# Patient Record
Sex: MALE | Race: BLACK OR AFRICAN AMERICAN | NOT HISPANIC OR LATINO | Employment: OTHER | URBAN - METROPOLITAN AREA
[De-identification: names, ages, dates, MRNs, and addresses within clinical notes are randomized per-mention and may not be internally consistent; named-entity substitution may affect disease eponyms.]

---

## 2023-08-31 PROBLEM — E03.9 HYPOTHYROIDISM: Status: ACTIVE | Noted: 2023-08-31

## 2023-08-31 PROBLEM — C09.9: Status: ACTIVE | Noted: 2023-08-31

## 2023-08-31 PROBLEM — K11.7 XEROSTOMIA: Status: ACTIVE | Noted: 2023-08-31

## 2023-08-31 PROBLEM — R13.12 OROPHARYNGEAL DYSPHAGIA: Status: ACTIVE | Noted: 2023-08-31

## 2023-08-31 PROBLEM — E11.9 DIABETES MELLITUS (MULTI): Status: ACTIVE | Noted: 2023-08-31

## 2023-08-31 PROBLEM — T66.XXXA ADVERSE EFFECT OF RADIATION THERAPY: Status: ACTIVE | Noted: 2023-08-31

## 2023-08-31 PROBLEM — C77.9: Status: ACTIVE | Noted: 2023-08-31

## 2023-08-31 RX ORDER — ASPIRIN 325 MG
TABLET ORAL
COMMUNITY

## 2023-08-31 RX ORDER — LANCETS 26 GAUGE
1 EACH MISCELLANEOUS AS NEEDED
COMMUNITY

## 2023-08-31 RX ORDER — ASCORBIC ACID 250 MG
TABLET ORAL
COMMUNITY

## 2023-08-31 RX ORDER — HUMAN INSULIN 100 [IU]/ML
INJECTION, SUSPENSION SUBCUTANEOUS
COMMUNITY
Start: 2021-04-18

## 2023-08-31 RX ORDER — METFORMIN HYDROCHLORIDE 500 MG/1
TABLET ORAL
COMMUNITY
Start: 2019-02-18

## 2023-08-31 RX ORDER — ZINC SULFATE 50(220)MG
CAPSULE ORAL
COMMUNITY

## 2023-08-31 RX ORDER — BLOOD SUGAR DIAGNOSTIC
STRIP MISCELLANEOUS
COMMUNITY
Start: 2021-04-18

## 2023-08-31 RX ORDER — GLIMEPIRIDE 4 MG/1
TABLET ORAL
COMMUNITY
End: 2024-04-08 | Stop reason: ALTCHOICE

## 2023-08-31 RX ORDER — LANCETS
EACH MISCELLANEOUS
COMMUNITY

## 2023-08-31 RX ORDER — DIAZEPAM 5 MG/1
TABLET ORAL
COMMUNITY
Start: 2021-05-26 | End: 2024-04-08 | Stop reason: ALTCHOICE

## 2023-08-31 RX ORDER — LISINOPRIL 5 MG/1
TABLET ORAL
COMMUNITY

## 2023-08-31 RX ORDER — PEN NEEDLE, DIABETIC 30 GX3/16"
NEEDLE, DISPOSABLE MISCELLANEOUS
COMMUNITY

## 2023-08-31 RX ORDER — SIMVASTATIN 10 MG/1
TABLET, FILM COATED ORAL
COMMUNITY
Start: 2020-12-14

## 2023-08-31 RX ORDER — GLIMEPIRIDE 2 MG/1
TABLET ORAL
COMMUNITY
Start: 2021-04-18 | End: 2024-04-08 | Stop reason: ALTCHOICE

## 2023-10-09 ENCOUNTER — OFFICE VISIT (OUTPATIENT)
Dept: OTOLARYNGOLOGY | Facility: CLINIC | Age: 58
End: 2023-10-09
Payer: COMMERCIAL

## 2023-10-09 VITALS — WEIGHT: 240 LBS | HEIGHT: 73 IN | BODY MASS INDEX: 31.81 KG/M2

## 2023-10-09 DIAGNOSIS — Z79.4 TYPE 2 DIABETES MELLITUS WITHOUT COMPLICATION, WITH LONG-TERM CURRENT USE OF INSULIN (MULTI): Primary | ICD-10-CM

## 2023-10-09 DIAGNOSIS — C09.9 MALIGNANT NEOPLASM OF TONSIL, UNSPECIFIED (MULTI): ICD-10-CM

## 2023-10-09 DIAGNOSIS — R13.12 OROPHARYNGEAL DYSPHAGIA: ICD-10-CM

## 2023-10-09 DIAGNOSIS — K11.7 XEROSTOMIA: ICD-10-CM

## 2023-10-09 DIAGNOSIS — E11.9 TYPE 2 DIABETES MELLITUS WITHOUT COMPLICATION, WITH LONG-TERM CURRENT USE OF INSULIN (MULTI): Primary | ICD-10-CM

## 2023-10-09 DIAGNOSIS — T66.XXXD ADVERSE EFFECT OF RADIATION THERAPY, SUBSEQUENT ENCOUNTER: ICD-10-CM

## 2023-10-09 PROBLEM — C77.9: Status: RESOLVED | Noted: 2023-08-31 | Resolved: 2023-10-09

## 2023-10-09 PROCEDURE — 4010F ACE/ARB THERAPY RXD/TAKEN: CPT | Performed by: OTOLARYNGOLOGY

## 2023-10-09 PROCEDURE — 99214 OFFICE O/P EST MOD 30 MIN: CPT | Performed by: OTOLARYNGOLOGY

## 2023-10-09 PROCEDURE — 1036F TOBACCO NON-USER: CPT | Performed by: OTOLARYNGOLOGY

## 2023-10-09 PROCEDURE — 92511 NASOPHARYNGOSCOPY: CPT | Performed by: OTOLARYNGOLOGY

## 2023-10-09 ASSESSMENT — ENCOUNTER SYMPTOMS
NAUSEA: 0
COUGH: 0
FEVER: 0
SORE THROAT: 0
TROUBLE SWALLOWING: 0
FACIAL SWELLING: 0
CHILLS: 0
VOMITING: 0
ADENOPATHY: 0
NECK PAIN: 0
FATIGUE: 0
STRIDOR: 0
UNEXPECTED WEIGHT CHANGE: 0
SHORTNESS OF BREATH: 0
VOICE CHANGE: 0
APPETITE CHANGE: 0

## 2023-10-09 NOTE — PATIENT INSTRUCTIONS
Dr. West evaluated you today.    Your care plan is outlined below:  -- Follow up with Dr. West in 6 mo.  This appointment was scheduled at the end of your visit today.  If you need to reschedule, please call the office at [phone].  Please keep in mind that last minute cancellations often result in delayed follow-up appointments.       General appointment line please call [phone]  For general questions or scheduling issues please call [phone] option #2   For medical questions or surgery scheduling please call [phone] on Mondays, Wednesdays and Thursdays or [phone] on Tuesdays and Fridays. Please be sure to leave a voice mail or your call will not be able to be returned.     Dr. West makes every effort to run on time for your appointments.  Therefore, if you are more than 30 minutes late for your appointment, unrelated to a scan or another appointment such as chemotherapy or radiation, your appointment will need to be rescheduled to another day.  We appreciate your understanding.

## 2023-10-09 NOTE — PROGRESS NOTES
Cancer follow up  TSH: Due 4/24  Lab Results   Component Value Date    TSH 1.36 03/29/2021   Chest: Due 4/24    HPI:  Leon Nieves a 58 y.o.old male   Chief Complaint   Patient presents with    6 month FUV   Last seen 4/23.  He is here for follow up of his left oropharyngeal cancer now 4 1/2 years out.  He is doing well.  He had recent blood work.  His Creatinine was elevated 1.36 but GFR is still normal.  His A1C is also elevated 7.4.  TSH is normal.  He has had some issues with his right calf but it is getting better.  He is trying to maintain his weight despite not being able to run for exercise.  He has switched his PCP and is happier/feeling more connected.    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 Final pathology 4cm primary tumor with negative margins and 5 of 22 lymph nodes with +metastasis with +ECS  5/13/19: Started chemoradiation therapy  6/24/19: Completed chemoradiation therapy  9/10/19: CT neck w/contrast personally reviewed with good response, no persistent masses or lymphadenopathy  10/1/19: 3 month post treatment PET. SUV (2.6) at the left tonsil SUV (1.8) at the left neck, no distant metastasis  5/14/20: TSH 1.16   10/5/20: R parotid swelling, mouth irritation  10/12/20: Found to have irritated alveolar mucosa around his partially erupted right 3rd molar (tooth #32)  10/26/20: +right submandibular gland swelling, no pain  12/3/20: CT neck w/ contrast for right facial swelling. Negative for masses in the head or neck. Prominent right parotid and submandibular gland   3/21: CXR and TSH normal  3/22: CXR and TSH normal  4/23: TSH normal    ROS:  Review of Systems   Constitutional:  Negative for appetite change, chills, fatigue, fever and unexpected weight change.   HENT:  Negative for dental problem,  drooling, ear pain, facial swelling, hearing loss, mouth sores, sore throat, tinnitus, trouble swallowing and voice change.    Respiratory:  Negative for cough, shortness of breath and stridor.    Gastrointestinal:  Negative for nausea and vomiting.   Musculoskeletal:  Negative for neck pain.        +right calf discomfort   Hematological:  Negative for adenopathy.        PE:  ENT Physical Exam  Constitutional  Appearance: patient appears well-developed and well-groomed, patient is cooperative;  Communication/Voice: vocal quality normal;  Head and Face  Appearance: head appears normal and face appears normal;  Salivary: Salivary comments: + right parotid gland hypertrophied (stable)  Ear  Hearing: intact;  Auricles: right auricle normal; left auricle normal;  Ear Canals: right ear canal normal; left ear canal normal;  Tympanic Membranes: right tympanic membrane normal; left tympanic membrane normal;  Nose  External Nose: nares patent bilaterally; external nose normal;  Internal Nose: nasal mucosa normal; septum normal; bilateral inferior turbinates normal;  Oral Cavity/Oropharynx  Lips: normal;  Teeth: no trismus noted;  Gums: gingiva normal;  Tongue: normal;  Oral mucosa: normal;  Hard palate: normal;  Soft palate: normal;  Tonsils: normal;  Base of Tongue: normal;  OC/OP comments: Uvula deviated from previous oropharyngeal resection - stable.  Oropharynx soft, no masses or lesions.  Neck  Neck: scars and radiation changes present; no lesions present; no neck tenderness present; normal trachea;  Neck comments: No lymphadenopathy, mild fibrosis  Respiratory  Inspection: breathing unlabored;  Cardiovascular  Inspection: extremities are warm and well perfused;  Neurovestibular  Mental Status: alert and oriented;  Psychiatric: mood normal; affect is appropriate;  Cranial Nerves: cranial nerves intact;       Procedures   PROCEDURE NOTE:  Recommended flexible nasopharyngoscopy & laryngoscopy.  Risks, benefits, personnel  and alternatives were explained.  The patient wished to proceed.  S/he was re-identified.  PROCEDURE:  Flexible nasopharyngoscopy and laryngoscopy  PREOPERATIVE DIAGNOSIS: oropharyngeal cancer surveillance  POSTOPERATIVE DIAGNOSIS: Same as above, no evidence of recurrence   INDICATIONS: Inability to tolerate mirror exam  ANESTHESIA: 4% lidocaine and 0.5% phenylephrine  PROCEDURE:  With the patient sitting upright topical anesthesia and vasoconstriction was applied with spray to the right side(s) of the nose.  After waiting an appropriate period of time for anesthesia/vasoconstriction to become effective, a flexible laryngoscope was passed through the right side(s) of the nose.  Nasopharynx, oropharynx, hypopharynx and larynx were examined.  FINDINGS:  The nasopharynx and oropharynx were normal without any lesions or masses visualized.  No masses or lesions were visualized at the base of tongue, vallecula, epiglottis, aryepiglottic folds, pyriform sinuses, and lateral pharyngeal walls.  True vocal fold movement was normal.  Mild radiation edema - stable. The patient's airway was widely patent with no evidence of obstruction.  Patient tolerated the procedure well, and there were no complications.    ASSESSMENT AND PLAN:  Problem List Items Addressed This Visit       Adverse effect of radiation therapy    Current Assessment & Plan     - muscle spasms at times         Type 2 diabetes mellitus without complication, with long-term current use of insulin (CMS/[facility]) - Primary    Current Assessment & Plan     Hemoglobin A1C slightly elevated, he will follow up with his PCP         Malignant neoplasm of tonsil, unspecified (CMS/HCC)    Current Assessment & Plan     - 4 years out. No evidence of disease. Follow up in 6 mo           Oropharyngeal dysphagia    Current Assessment & Plan     - stable  - Continue home swallowing strategies         Xerostomia    Current Assessment & Plan     - adverse effects of radiation. chronic,  stable.  - Continue conservative tx with humdification        Yessica West MD    Head & Neck Surgical Oncology & Reconstruction  Department of Otolaryngology - Head and Neck Surgery         By signing my name below, I, Alisha Pageibflaquita, attest that this documentation has been prepared under the direction and in the presence of Dr. Yessica West MD.     All medical record entries made by the Scribe were at my direction and personally dictated by me, Dr. Yessica West. I have reviewed the chart and agree that the record accurately reflects my personal performance of the history, physical exam, discussion and plan.

## 2023-10-09 NOTE — LETTER
October 9, 2023     Juliann Rubin PA-C  [address]  [facility] [address]    Patient: Leon Nick   YOB: 1965   Date of Visit: 10/9/2023       Dear Dr. Juliann Rubin PA-C:    Thank you for referring Leon Nick to me for evaluation. Below are my notes for this consultation.  If you have questions, please do not hesitate to call me. I look forward to following your patient along with you.       Sincerely,     Yessica West MD      CC: No Recipients  ______________________________________________________________________________________    Cancer follow up  TSH: Due 4/24  Lab Results   Component Value Date    TSH 1.36 03/29/2021   Chest: Due 4/24    HPI:  Leon Nickis a 58 y.o.old male   Chief Complaint   Patient presents with   • 6 month FUV   Last seen 4/23.  He is here for follow up of his left oropharyngeal cancer now 4 1/2 years out.  He is doing well.  He had recent blood work.  His Creatinine was elevated 1.36 but GFR is still normal.  His A1C is also elevated 7.4.  TSH is normal.  He has had some issues with his right calf but it is getting better.  He is trying to maintain his weight despite not being able to run for exercise.  He has switched his PCP and is happier/feeling more connected.    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 Final pathology 4cm primary tumor with negative margins and 5 of 22 lymph nodes with +metastasis with +ECS  5/13/19: Started chemoradiation therapy  6/24/19: Completed chemoradiation therapy  9/10/19: CT neck w/contrast personally reviewed with good response, no persistent masses or lymphadenopathy  10/1/19: 3 month post treatment PET. SUV (2.6) at the left tonsil SUV (1.8) at the left neck, no distant metastasis  5/14/20: TSH 1.16    10/5/20: R parotid swelling, mouth irritation  10/12/20: Found to have irritated alveolar mucosa around his partially erupted right 3rd molar (tooth #32)  10/26/20: +right submandibular gland swelling, no pain  12/3/20: CT neck w/ contrast for right facial swelling. Negative for masses in the head or neck. Prominent right parotid and submandibular gland   3/21: CXR and TSH normal  3/22: CXR and TSH normal  4/23: TSH normal    ROS:  Review of Systems   Constitutional:  Negative for appetite change, chills, fatigue, fever and unexpected weight change.   HENT:  Negative for dental problem, drooling, ear pain, facial swelling, hearing loss, mouth sores, sore throat, tinnitus, trouble swallowing and voice change.    Respiratory:  Negative for cough, shortness of breath and stridor.    Gastrointestinal:  Negative for nausea and vomiting.   Musculoskeletal:  Negative for neck pain.        +right calf discomfort   Hematological:  Negative for adenopathy.        PE:  ENT Physical Exam  Constitutional  Appearance: patient appears well-developed and well-groomed, patient is cooperative;  Communication/Voice: vocal quality normal;  Head and Face  Appearance: head appears normal and face appears normal;  Salivary: Salivary comments: + right parotid gland hypertrophied (stable)  Ear  Hearing: intact;  Auricles: right auricle normal; left auricle normal;  Ear Canals: right ear canal normal; left ear canal normal;  Tympanic Membranes: right tympanic membrane normal; left tympanic membrane normal;  Nose  External Nose: nares patent bilaterally; external nose normal;  Internal Nose: nasal mucosa normal; septum normal; bilateral inferior turbinates normal;  Oral Cavity/Oropharynx  Lips: normal;  Teeth: no trismus noted;  Gums: gingiva normal;  Tongue: normal;  Oral mucosa: normal;  Hard palate: normal;  Soft palate: normal;  Tonsils: normal;  Base of Tongue: normal;  OC/OP comments: Uvula deviated from previous oropharyngeal  resection - stable.  Oropharynx soft, no masses or lesions.  Neck  Neck: scars and radiation changes present; no lesions present; no neck tenderness present; normal trachea;  Neck comments: No lymphadenopathy, mild fibrosis  Respiratory  Inspection: breathing unlabored;  Cardiovascular  Inspection: extremities are warm and well perfused;  Neurovestibular  Mental Status: alert and oriented;  Psychiatric: mood normal; affect is appropriate;  Cranial Nerves: cranial nerves intact;       Procedures   PROCEDURE NOTE:  Recommended flexible nasopharyngoscopy & laryngoscopy.  Risks, benefits, personnel and alternatives were explained.  The patient wished to proceed.  S/he was re-identified.  PROCEDURE:  Flexible nasopharyngoscopy and laryngoscopy  PREOPERATIVE DIAGNOSIS: oropharyngeal cancer surveillance  POSTOPERATIVE DIAGNOSIS: Same as above, no evidence of recurrence   INDICATIONS: Inability to tolerate mirror exam  ANESTHESIA: 4% lidocaine and 0.5% phenylephrine  PROCEDURE:  With the patient sitting upright topical anesthesia and vasoconstriction was applied with spray to the right side(s) of the nose.  After waiting an appropriate period of time for anesthesia/vasoconstriction to become effective, a flexible laryngoscope was passed through the right side(s) of the nose.  Nasopharynx, oropharynx, hypopharynx and larynx were examined.  FINDINGS:  The nasopharynx and oropharynx were normal without any lesions or masses visualized.  No masses or lesions were visualized at the base of tongue, vallecula, epiglottis, aryepiglottic folds, pyriform sinuses, and lateral pharyngeal walls.  True vocal fold movement was normal.  Mild radiation edema - stable. The patient's airway was widely patent with no evidence of obstruction.  Patient tolerated the procedure well, and there were no complications.    ASSESSMENT AND PLAN:  Problem List Items Addressed This Visit       Adverse effect of radiation therapy    Current Assessment &  Plan     - muscle spasms at times         Type 2 diabetes mellitus without complication, with long-term current use of insulin (CMS/HCC) - Primary    Current Assessment & Plan     Hemoglobin A1C slightly elevated, he will follow up with his PCP         Malignant neoplasm of tonsil, unspecified (CMS/HCC)    Current Assessment & Plan     - 4 years out. No evidence of disease. Follow up in 6 mo           Oropharyngeal dysphagia    Current Assessment & Plan     - stable  - Continue home swallowing strategies         Xerostomia    Current Assessment & Plan     - adverse effects of radiation. chronic, stable.  - Continue conservative tx with humdification        Yessica West MD    Head & Neck Surgical Oncology & Reconstruction  Department of Otolaryngology - Head and Neck Surgery         By signing my name below, I, Alisha Pageibe, attest that this documentation has been prepared under the direction and in the presence of Dr. Yessiac West MD.     All medical record entries made by the Scribe were at my direction and personally dictated by me, Dr. Yessica West. I have reviewed the chart and agree that the record accurately reflects my personal performance of the history, physical exam, discussion and plan.

## 2024-03-26 ASSESSMENT — ENCOUNTER SYMPTOMS
NECK PAIN: 0
NAUSEA: 0
VOICE CHANGE: 0
CHILLS: 0
STRIDOR: 0
COUGH: 0
SHORTNESS OF BREATH: 0
ADENOPATHY: 0
VOMITING: 0
SORE THROAT: 0
FEVER: 0
FACIAL SWELLING: 0
TROUBLE SWALLOWING: 0
APPETITE CHANGE: 0
UNEXPECTED WEIGHT CHANGE: 0
FATIGUE: 0

## 2024-03-26 NOTE — PROGRESS NOTES
Cancer follow up  TSH: Due 4/24  Chest: Due 4/24    HPI:  Leon Nick is a 58 y.o. male here for follow up. Last seen 10/23.  He is here for follow up of his left oropharyngeal cancer now 5 years out!  He is doing well.  He had recent blood work.  His Creatinine was elevated 1.45.  His A1C is also still elevated at 7.1.  He has had some issues with his right calf but it is getting better.  He is trying to maintain his weight despite not being able to run for exercise.  He has switched his PCP and is happier/feeling more connected.    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 Final pathology 4cm primary tumor with negative margins and 5 of 22 lymph nodes with +metastasis with +ECS  5/13/19: Started chemoradiation therapy  6/24/19: Completed chemoradiation therapy  9/10/19: CT neck w/contrast personally reviewed with good response, no persistent masses or lymphadenopathy  10/1/19: 3 month post treatment PET. SUV (2.6) at the left tonsil SUV (1.8) at the left neck, no distant metastasis  5/14/20: TSH 1.16   10/5/20: R parotid swelling, mouth irritation  10/12/20: Found to have irritated alveolar mucosa around his partially erupted right 3rd molar (tooth #32)  10/26/20: +right submandibular gland swelling, no pain  12/3/20: CT neck w/ contrast for right facial swelling. Negative for masses in the head or neck. Prominent right parotid and submandibular gland   3/21: CXR and TSH normal  3/22: CXR and TSH normal  4/23: TSH normal    ROS:  Review of Systems   Constitutional:  Negative for appetite change, chills, fatigue, fever and unexpected weight change.   HENT:  Negative for dental problem, drooling, ear pain, facial swelling, hearing loss, mouth sores, sore throat, tinnitus, trouble swallowing and voice change.     Respiratory:  Negative for cough, shortness of breath and stridor.    Gastrointestinal:  Negative for nausea and vomiting.   Musculoskeletal:  Negative for neck pain.        +right calf discomfort   Hematological:  Negative for adenopathy.        PE:  ENT Physical Exam  Constitutional  Appearance: patient appears well-developed and well-groomed, patient is cooperative;  Communication/Voice: vocal quality normal;  Head and Face  Appearance: head appears normal and face appears normal;  Salivary: Salivary comments: + right parotid gland hypertrophied (stable)  Ear  Hearing: intact;  Auricles: right auricle normal; left auricle normal;  Ear Canals: right ear canal normal; left ear canal normal;  Tympanic Membranes: right tympanic membrane normal; left tympanic membrane normal;  Nose  External Nose: nares patent bilaterally; external nose normal;  Internal Nose: nasal mucosa normal; septum normal;  Oral Cavity/Oropharynx  Lips: normal;  Teeth: no trismus noted;  Gums: gingiva normal;  Tongue: normal;  Oral mucosa: normal;  OC/OP comments: Uvula deviated from previous oropharyngeal resection - stable.  Oropharynx soft, no masses or lesions.  Neck  Neck: scars and radiation changes present; no lesions present; no neck tenderness present; normal trachea;  Neck comments: No lymphadenopathy, mild fibrosis  Respiratory  Inspection: breathing unlabored;  Cardiovascular  Inspection: extremities are warm and well perfused;  Neurovestibular  Mental Status: alert and oriented;  Psychiatric: mood normal; affect is appropriate;  Cranial Nerves: cranial nerves intact;       Procedures   PROCEDURE NOTE:  Recommended flexible nasopharyngoscopy & laryngoscopy.  Risks, benefits, personnel and alternatives were explained.  The patient wished to proceed.  S/he was re-identified.  PROCEDURE:  Flexible nasopharyngoscopy and laryngoscopy  PREOPERATIVE DIAGNOSIS: oropharyngeal cancer surveillance  POSTOPERATIVE DIAGNOSIS: Same as above, no  evidence of recurrence  INDICATIONS: Inability to tolerate mirror exam  ANESTHESIA: 4% lidocaine and 0.5% phenylephrine  PROCEDURE:  With the patient sitting upright topical anesthesia and vasoconstriction was applied with spray to the right side(s) of the nose.  After waiting an appropriate period of time for anesthesia/vasoconstriction to become effective, a flexible laryngoscope was passed through the right side(s) of the nose.  Nasopharynx, oropharynx, hypopharynx and larynx were examined.  FINDINGS:  The nasopharynx and oropharynx were normal without any lesions or masses visualized.  No masses or lesions were visualized at the base of tongue, vallecula, epiglottis, aryepiglottic folds, pyriform sinuses, and lateral pharyngeal walls.  True vocal fold movement was normal.  Mild radiation edema - stable. The patient's airway was widely patent with no evidence of obstruction.  Patient tolerated the procedure well, and there were no complications.    ASSESSMENT AND PLAN:  Problem List Items Addressed This Visit       Adverse effect of radiation therapy    Hypothyroidism    Current Assessment & Plan     TSH due now, ordered, will call/message with results         Malignant neoplasm of tonsil, unspecified (Multi)    Current Assessment & Plan     No evidence of disease on exam or endoscopy  Follow up in 1 year         Oropharyngeal dysphagia    Current Assessment & Plan     Chronic, adverse effect of radiation, mild  Continue current diet, weight stable         Xerostomia    Current Assessment & Plan     Chronic, adverse effect of radiation, stable  Continue conservative tx         Lymphedema    Current Assessment & Plan     Mild, adverse effect of radiation, chronic  Continue massage & conservative tx         Parotid swelling - Primary    Current Assessment & Plan     Right parotid hypertrophy, chronic, stable          Yessica West MD    Head & Neck Surgical Oncology & Reconstruction  Department of  Otolaryngology - Head and Neck Surgery     By signing my name below, I, Edmund Page, attest that this documentation has been prepared under the direction and in the presence of Dr. Yessica West MD.     All medical record entries made by the Scribe were at my direction and personally dictated by me, Dr. Yessica West. I have reviewed the chart and agree that the record accurately reflects my personal performance of the history, physical exam, discussion and plan.

## 2024-04-08 ENCOUNTER — OFFICE VISIT (OUTPATIENT)
Dept: OTOLARYNGOLOGY | Facility: CLINIC | Age: 59
End: 2024-04-08
Payer: COMMERCIAL

## 2024-04-08 VITALS — WEIGHT: 231.4 LBS | HEIGHT: 73 IN | BODY MASS INDEX: 30.67 KG/M2

## 2024-04-08 DIAGNOSIS — I89.0 LYMPHEDEMA: ICD-10-CM

## 2024-04-08 DIAGNOSIS — R60.9 PAROTID SWELLING: Primary | ICD-10-CM

## 2024-04-08 DIAGNOSIS — T66.XXXD ADVERSE EFFECT OF RADIATION THERAPY, SUBSEQUENT ENCOUNTER: ICD-10-CM

## 2024-04-08 DIAGNOSIS — C09.9 MALIGNANT NEOPLASM OF TONSIL, UNSPECIFIED (MULTI): ICD-10-CM

## 2024-04-08 DIAGNOSIS — K11.7 XEROSTOMIA: ICD-10-CM

## 2024-04-08 DIAGNOSIS — R13.12 OROPHARYNGEAL DYSPHAGIA: ICD-10-CM

## 2024-04-08 DIAGNOSIS — E03.9 ACQUIRED HYPOTHYROIDISM: ICD-10-CM

## 2024-04-08 PROBLEM — R55 SYNCOPE: Status: ACTIVE | Noted: 2021-04-14

## 2024-04-08 PROBLEM — Z85.818 HISTORY OF CANCER TONSIL: Status: ACTIVE | Noted: 2021-04-14

## 2024-04-08 PROBLEM — E86.0 DEHYDRATION: Status: ACTIVE | Noted: 2019-06-14

## 2024-04-08 PROBLEM — R09.02 HYPOXIA: Status: ACTIVE | Noted: 2021-04-14

## 2024-04-08 PROBLEM — R06.89 ACUTE RESPIRATORY INSUFFICIENCY: Status: ACTIVE | Noted: 2021-04-14

## 2024-04-08 PROBLEM — T38.0X5A STEROID-INDUCED HYPERGLYCEMIA: Status: ACTIVE | Noted: 2021-04-14

## 2024-04-08 PROBLEM — R73.9 STEROID-INDUCED HYPERGLYCEMIA: Status: ACTIVE | Noted: 2021-04-14

## 2024-04-08 PROBLEM — R11.0 NAUSEA: Status: ACTIVE | Noted: 2019-06-14

## 2024-04-08 PROBLEM — U07.1 COVID-19: Status: ACTIVE | Noted: 2021-04-13

## 2024-04-08 PROBLEM — E78.2 MIXED HYPERLIPIDEMIA: Status: ACTIVE | Noted: 2023-09-01

## 2024-04-08 PROCEDURE — 99214 OFFICE O/P EST MOD 30 MIN: CPT | Performed by: OTOLARYNGOLOGY

## 2024-04-08 PROCEDURE — 92511 NASOPHARYNGOSCOPY: CPT | Performed by: OTOLARYNGOLOGY

## 2024-04-08 PROCEDURE — 4010F ACE/ARB THERAPY RXD/TAKEN: CPT | Performed by: OTOLARYNGOLOGY

## 2024-04-08 RX ORDER — DULAGLUTIDE 0.75 MG/.5ML
0.75 INJECTION, SOLUTION SUBCUTANEOUS
COMMUNITY

## 2024-04-08 RX ORDER — AMOXICILLIN 250 MG
2 CAPSULE ORAL
COMMUNITY
Start: 2024-01-15

## 2024-04-08 RX ORDER — MINERAL OIL
180 ENEMA (ML) RECTAL
COMMUNITY
Start: 2022-04-22

## 2024-04-08 RX ORDER — AZELASTINE 1 MG/ML
2 SPRAY, METERED NASAL 2 TIMES DAILY
COMMUNITY
Start: 2023-07-13

## 2024-04-08 RX ORDER — FLUTICASONE PROPIONATE 50 MCG
1 SPRAY, SUSPENSION (ML) NASAL 2 TIMES DAILY
COMMUNITY
Start: 2023-07-13

## 2024-04-08 RX ORDER — TAMSULOSIN HYDROCHLORIDE 0.4 MG/1
0.4 CAPSULE ORAL DAILY
COMMUNITY

## 2024-04-21 PROBLEM — U07.1 COVID-19: Status: RESOLVED | Noted: 2021-04-13 | Resolved: 2024-04-21

## 2024-04-21 PROBLEM — R09.02 HYPOXIA: Status: RESOLVED | Noted: 2021-04-14 | Resolved: 2024-04-21

## 2024-04-21 PROBLEM — R06.89 ACUTE RESPIRATORY INSUFFICIENCY: Status: RESOLVED | Noted: 2021-04-14 | Resolved: 2024-04-21

## 2025-04-04 ASSESSMENT — ENCOUNTER SYMPTOMS
NAUSEA: 0
UNEXPECTED WEIGHT CHANGE: 0
ADENOPATHY: 0
FATIGUE: 0
SHORTNESS OF BREATH: 0
STRIDOR: 0
VOICE CHANGE: 0
NECK PAIN: 0
FACIAL SWELLING: 0
SORE THROAT: 0
APPETITE CHANGE: 0
COUGH: 0
TROUBLE SWALLOWING: 0
VOMITING: 0
FEVER: 0
CHILLS: 0

## 2025-04-04 NOTE — PROGRESS NOTES
Cancer follow up  TSH: Due now  Chest: Due now    HPI:  Leon Nick is a 59 y.o. male here for follow up. Last seen 4/2024.  He is here for follow up of his left oropharyngeal cancer now 6 years out.  He continues to do well.  His right parotid remains the same without any episodes of enlargement.  He has occasional neck soreness but otherwise is doing well.  Denies odynophagia, dysphagia or otalgia. Tolerating a regular diet.  Denies weight loss.  No fevers/chills.  No night sweats.  His dad is currently battling cancer.      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 Final pathology 4cm primary tumor with negative margins and 5 of 22 lymph nodes with +metastasis with +ECS  5/13/19: Started chemoradiation therapy  6/24/19: Completed chemoradiation therapy  9/10/19: CT neck w/contrast personally reviewed with good response, no persistent masses or lymphadenopathy  10/1/19: 3 month post treatment PET. SUV (2.6) at the left tonsil SUV (1.8) at the left neck, no distant metastasis  5/14/20: TSH 1.16   10/5/20: R parotid swelling, mouth irritation  10/12/20: Found to have irritated alveolar mucosa around his partially erupted right 3rd molar (tooth #32)  10/26/20: +right submandibular gland swelling, no pain  12/3/20: CT neck w/ contrast for right facial swelling. Negative for masses in the head or neck. Prominent right parotid and submandibular gland   3/21: CXR and TSH normal  3/22: CXR and TSH normal  4/23: TSH normal  8/24: TSH normal  3/25: TSH normal    ROS:  Review of Systems   Constitutional:  Negative for appetite change, chills, fatigue, fever and unexpected weight change.   HENT:  Negative for dental problem, drooling, ear pain, facial swelling, hearing loss, mouth sores, sore throat, tinnitus, trouble swallowing  and voice change.    Respiratory:  Negative for cough, shortness of breath and stridor.    Gastrointestinal:  Negative for nausea and vomiting.   Musculoskeletal:  Negative for neck pain.   Hematological:  Negative for adenopathy.        PE:  ENT Physical Exam  Constitutional  Appearance: patient appears well-developed and well-groomed, patient is cooperative;  Communication/Voice: vocal quality normal;  Head and Face  Appearance: head appears normal and face appears normal;  Salivary: Salivary comments: + right parotid gland hypertrophied (stable)  Ear  Hearing: intact;  Auricles: right auricle normal; left auricle normal;  Ear Canals: right ear canal normal; left ear canal normal;  Tympanic Membranes: right tympanic membrane normal; left tympanic membrane normal;  Nose  External Nose: nares patent bilaterally; external nose normal;  Internal Nose: nasal mucosa normal; septum normal;  Oral Cavity/Oropharynx  Lips: normal;  Teeth: no trismus noted;  Gums: gingiva normal;  Tongue: normal;  Oral mucosa: normal;  OC/OP comments: Uvula deviated from previous oropharyngeal resection - stable.  Oropharynx soft, no masses or lesions.  Neck  Neck: scars and radiation changes present; no lesions present; no neck tenderness present; normal trachea;  Neck comments: No lymphadenopathy, mild fibrosis  Respiratory  Inspection: breathing unlabored;  Cardiovascular  Inspection: extremities are warm and well perfused;  Neurovestibular  Mental Status: alert and oriented;  Psychiatric: mood normal; affect is appropriate;  Cranial Nerves: cranial nerves intact;       Procedures   PROCEDURE NOTE:  Recommended flexible nasopharyngoscopy & laryngoscopy.  Risks, benefits, personnel and alternatives were explained.  The patient wished to proceed.  S/he was re-identified.  PROCEDURE:  Flexible nasopharyngoscopy and laryngoscopy  PREOPERATIVE DIAGNOSIS: oropharyngeal cancer surveillance  POSTOPERATIVE DIAGNOSIS: Same as above, no evidence of  recurrence, no masses or lesions  INDICATIONS: Inability to tolerate mirror exam  ANESTHESIA: 4% lidocaine and 0.5% phenylephrine  PROCEDURE:  With the patient sitting upright topical anesthesia and vasoconstriction was applied with spray to the right side(s) of the nose.  After waiting an appropriate period of time for anesthesia/vasoconstriction to become effective, a flexible laryngoscope was passed through the right side(s) of the nose.  Nasopharynx, oropharynx, hypopharynx and larynx were examined.  FINDINGS:  The nasopharynx and oropharynx were normal without any lesions or masses visualized.  No masses or lesions were visualized at the base of tongue, vallecula, epiglottis, aryepiglottic folds, pyriform sinuses, and lateral pharyngeal walls.  True vocal fold movement was normal.  Mild radiation edema - stable. The patient's airway was widely patent with no evidence of obstruction.  Patient tolerated the procedure well, and there were no complications.    ASSESSMENT AND PLAN:  Problem List Items Addressed This Visit       Adverse effect of radiation therapy    Hypothyroidism    Malignant neoplasm of tonsil, unspecified    Current Assessment & Plan   No evidence of disease on exam or endoscopy  Follow up in 1 year         Oropharyngeal dysphagia    Current Assessment & Plan   Chronic, adverse effect of radiation, mild  Continue current diet, weight stable         Xerostomia    Current Assessment & Plan   Chronic, adverse effect of radiation, stable  Continue conservative tx         Parotid swelling - Primary    Current Assessment & Plan   Right parotid hypertrophy, chronic, stable  No acute episodes            Yessica West MD    Head & Neck Surgical Oncology & Reconstruction  Department of Otolaryngology - Head and Neck Surgery     By signing my name below, I, Edmund Page, attest that this documentation has been prepared under the direction and in the presence of Dr. Yessica West MD.     All  medical record entries made by the Scribe were at my direction and personally dictated by me, Dr. Yessica West. I have reviewed the chart and agree that the record accurately reflects my personal performance of the history, physical exam, discussion and plan.

## 2025-04-14 ENCOUNTER — APPOINTMENT (OUTPATIENT)
Dept: OTOLARYNGOLOGY | Facility: CLINIC | Age: 60
End: 2025-04-14
Payer: COMMERCIAL

## 2025-04-14 VITALS
WEIGHT: 236 LBS | DIASTOLIC BLOOD PRESSURE: 81 MMHG | HEART RATE: 85 BPM | SYSTOLIC BLOOD PRESSURE: 133 MMHG | BODY MASS INDEX: 31.14 KG/M2

## 2025-04-14 DIAGNOSIS — R60.9 PAROTID SWELLING: Primary | ICD-10-CM

## 2025-04-14 DIAGNOSIS — K11.7 XEROSTOMIA: ICD-10-CM

## 2025-04-14 DIAGNOSIS — C09.9 MALIGNANT NEOPLASM OF TONSIL, UNSPECIFIED: ICD-10-CM

## 2025-04-14 DIAGNOSIS — T66.XXXD ADVERSE EFFECT OF RADIATION THERAPY, SUBSEQUENT ENCOUNTER: ICD-10-CM

## 2025-04-14 DIAGNOSIS — E03.9 ACQUIRED HYPOTHYROIDISM: ICD-10-CM

## 2025-04-14 DIAGNOSIS — R13.12 OROPHARYNGEAL DYSPHAGIA: ICD-10-CM

## 2025-04-14 PROCEDURE — 3079F DIAST BP 80-89 MM HG: CPT | Performed by: OTOLARYNGOLOGY

## 2025-04-14 PROCEDURE — 3075F SYST BP GE 130 - 139MM HG: CPT | Performed by: OTOLARYNGOLOGY

## 2025-04-14 PROCEDURE — 99213 OFFICE O/P EST LOW 20 MIN: CPT | Performed by: OTOLARYNGOLOGY

## 2025-04-14 PROCEDURE — 4010F ACE/ARB THERAPY RXD/TAKEN: CPT | Performed by: OTOLARYNGOLOGY

## 2025-04-14 PROCEDURE — 1036F TOBACCO NON-USER: CPT | Performed by: OTOLARYNGOLOGY

## 2025-04-14 PROCEDURE — 92511 NASOPHARYNGOSCOPY: CPT | Performed by: OTOLARYNGOLOGY

## 2025-04-14 RX ORDER — NAPROXEN 500 MG/1
500 TABLET ORAL
COMMUNITY
Start: 2024-10-23

## 2025-04-14 RX ORDER — DULAGLUTIDE 1.5 MG/.5ML
1.5 INJECTION, SOLUTION SUBCUTANEOUS
COMMUNITY
Start: 2025-02-03